# Patient Record
Sex: MALE | Race: ASIAN | NOT HISPANIC OR LATINO | Employment: FULL TIME | URBAN - METROPOLITAN AREA
[De-identification: names, ages, dates, MRNs, and addresses within clinical notes are randomized per-mention and may not be internally consistent; named-entity substitution may affect disease eponyms.]

---
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
## 2024-06-02 ENCOUNTER — HOSPITAL ENCOUNTER (EMERGENCY)
Facility: HOSPITAL | Age: 19
Discharge: HOME | End: 2024-06-03
Attending: EMERGENCY MEDICINE | Admitting: EMERGENCY MEDICINE
Payer: OTHER MISCELLANEOUS

## 2024-06-02 DIAGNOSIS — S61.211A LACERATION OF LEFT INDEX FINGER WITHOUT FOREIGN BODY WITHOUT DAMAGE TO NAIL, INITIAL ENCOUNTER: ICD-10-CM

## 2024-06-02 PROCEDURE — 99283 EMERGENCY DEPT VISIT LOW MDM: CPT | Mod: 25

## 2024-06-02 PROCEDURE — 12001 RPR S/N/AX/GEN/TRNK 2.5CM/<: CPT

## 2024-06-02 ASSESSMENT — COLUMBIA-SUICIDE SEVERITY RATING SCALE - C-SSRS
2. HAVE YOU ACTUALLY HAD ANY THOUGHTS OF KILLING YOURSELF IN THE PAST MONTH?: NO
6. HAVE YOU EVER DONE ANYTHING, STARTED TO DO ANYTHING, OR PREPARED TO DO ANYTHING TO END YOUR LIFE?: NO
1. IN THE PAST MONTH, HAVE YOU WISHED YOU WERE DEAD OR WISHED YOU COULD GO TO SLEEP AND NOT WAKE UP?: NO

## 2024-06-03 VITALS
BODY MASS INDEX: 23.99 KG/M2 | DIASTOLIC BLOOD PRESSURE: 88 MMHG | WEIGHT: 144 LBS | HEART RATE: 82 BPM | RESPIRATION RATE: 19 BRPM | OXYGEN SATURATION: 99 % | HEIGHT: 65 IN | SYSTOLIC BLOOD PRESSURE: 138 MMHG | TEMPERATURE: 98 F

## 2024-06-03 PROCEDURE — 250N000009 HC RX 250: Performed by: EMERGENCY MEDICINE

## 2024-06-03 RX ORDER — GINSENG 100 MG
CAPSULE ORAL ONCE
Status: COMPLETED | OUTPATIENT
Start: 2024-06-03 | End: 2024-06-03

## 2024-06-03 RX ADMIN — Medication 3 ML: at 00:13

## 2024-06-03 RX ADMIN — BACITRACIN: 500 OINTMENT TOPICAL at 00:52

## 2024-06-03 NOTE — ED PROVIDER NOTES
"EMERGENCY DEPARTMENT NOTE     Name: Chan Salinas    Age/Sex: 18 year old male   MRN: [id number]   Evaluation Date & Time:  6/2/2024 11:24 PM    PCP:    [place]   ED Provider: Abdirahman Muniz D.O.       CHIEF COMPLAINT    Laceration       DIAGNOSIS & DISPOSITION/MEDICAL DECISION MAKING   No diagnosis found.    Chan Salinas is a 18 year old male with no relevant past history who presents to the emergency department for evaluation of laceration.        Medical Decision Making  Patient on exam had 2 cm laceration on the dorsum of the left index finger.  No extensor tendon involvement.  No foreign body.  Distal CSM intact.  Laceration was repaired as per procedure.  Patient will be discharged with routine wound care instruction, sutures out in 7 to 10 days.  Signs of infection redness swelling drainage or problems with follow-up will return to the emergency department.  Interventions: Laceration repair as per procedure note  Discharge Vital Signs:/88   Pulse 84   Temp 98  F (36.7  C) (Oral)   Resp 18   Ht 1.651 m (5' 5\")   Wt 65.3 kg (144 lb)   SpO2 98%   BMI 23.96 kg/m       DISPOSITION: Home    Diagnostic studies:  Imaging:  No orders to display      Lab:  Labs Ordered and Resulted from Time of ED Arrival to Time of ED Departure - No data to display            Triage note reviewed:Patient presents to ER with mom.  Was working and about 20 min PTA, a door was jammed open and he was trying to close it and used a pocket knife and slipped and cut his left index finger.      Bleeding is controlled in triage. Wrapped in triage.             History:  Supplemental history from: Documented in chart  External Record(s) reviewed: [facility] Tetanus [facility]    Work Up:  Chart documentation includes differential considered and any EKGs or imaging independently interpreted by provider, where specified.  In additional to work up documented, I considered the following work up: 3 but deferred secondary low " suspicion for fracture or foreign body    External consultation:  Discussion of management with another provider: Documented in chart, if applicable    Complicating factors:  Care impacted by chronic illness: N/A  Care affected by social determinants of health: Access to Medical Care    Disposition considerations: Discharge. No recommendations on prescription strength medication(s). N/A.    At the conclusion of the encounter I discussed the results of all of the tests and the disposition. The questions were answered. The patient or family acknowledged understanding and was agreeable with the care plan.    TOTAL CRITICAL CARE TIME (EXCLUDING PROCEDURES): Not applicable    PROCEDURES:     PROCEDURE: Laceration Repair   INDICATIONS: Laceration   PROCEDURE PROVIDER: Dr Abdirahman Muniz   SITE: Left Index finger   TYPE/SIZE: simple, clean, and no foreign body visualized  2 cm (total length)   FUNCTIONAL ASSESSMENT: Distal sensation, circulation, motor, and tendon function intact   MEDICATION: Topical LET   PREPARATION: scrubbing with  wound cleanser   DEBRIDEMENT: no debridement   CLOSURE:  Superficial layer closed with  4 stitches of 4-0 Ethilon simple interrupted    Total number of sutures/staples placed: 4          EMERGENCY DEPARTMENT COURSE   11:59 PM I met with the patient to gather history and to perform my initial exam.  We discussed treatment options and the plan for care while in the Emergency Department.  12:03 AM I reviewed [facility], patient is up to date on tetanus.     ED INTERVENTIONS   Medications - No data to display    DISCHARGE MEDICATIONS        Review of your medicines      You have not been prescribed any medications.           INFORMATION SOURCE AND LIMITATIONS    History/Exam limitations: N/A  Patient information was obtained from: patient   Use of : N/A    HISTORY OF PRESENT ILLNESS   Chan Salinas is a 18 year old year old male with no relevant past history, who presents to this ED by walk  "in for evaluation of laceration.    The patient reports trying to lock up a door. However, when the door would not close, he used a knife to try and close the door. In the process, he cut the dorsal aspect of his left index finger.     REVIEW OF SYSTEMS:   All other systems reviewed and are negative except as noted above in HPI.    PATIENT HISTORY   No past medical history on file.  There is no problem list on file for this patient.    No past surgical history on file.    No Known Allergies    OUTPATIENT MEDICATIONS     New Prescriptions    No medications on file      Vitals:    06/02/24 2322   BP: 138/88   Pulse: 84   Resp: 18   Temp: 98  F (36.7  C)   TempSrc: Oral   SpO2: 98%   Weight: 65.3 kg (144 lb)   Height: 1.651 m (5' 5\")       Physical Exam     Musculoskeletal: Normal range of motion. 2 cm laceration of dorsal aspect of proximal phalanx left index finger,   No extensor tendon involvement.  No foreign body.  Distal CSM intact    DIAGNOSTICS    LABORATORY FINDINGS (REVIEWED AND INTERPRETED):  Labs Ordered and Resulted from Time of ED Arrival to Time of ED Departure - No data to display      IMAGING (REVIEWED AND INTERPRETED):  No orders to display             IAnna, am serving as a scribe to document services personally performed by Abdirahman Muniz D.O., based on my observation and the provider s statements to me.    IAbdirahman D.O., attest that Anna Harding is acting in a scribe capacity, has observed my performance of the services and has documented them in accordance with my direction.    Abdirahman Muniz D.O.  EMERGENCY MEDICINE   06/02/24  [facility] EMERGENCY DEPARTMENT  [address] [place] [id number]  [phone]  Dept: [phone]     Abdirahman Muniz, DO  06/03/24 0025       Abdirahman Muniz DO  06/03/24 0049    "

## 2024-06-03 NOTE — ED TRIAGE NOTES
Patient presents to ER with mom.  Was working and about 20 min PTA, a door was jammed open and he was trying to close it and used a pocket knife and slipped and cut his left index finger.      Bleeding is controlled in triage. Wrapped in triage.

## 2024-06-03 NOTE — DISCHARGE INSTRUCTIONS
Cleanse wound daily with warm soapy water apply bacitracin.  Sutures should be removed in 7 to 10 days.  If signs of infection redness swelling or drainage return to the emergency department.